# Patient Record
Sex: FEMALE | Race: WHITE
[De-identification: names, ages, dates, MRNs, and addresses within clinical notes are randomized per-mention and may not be internally consistent; named-entity substitution may affect disease eponyms.]

---

## 2021-03-19 ENCOUNTER — HOSPITAL ENCOUNTER (EMERGENCY)
Dept: HOSPITAL 75 - ER FS | Age: 36
Discharge: HOME | End: 2021-03-19
Payer: COMMERCIAL

## 2021-03-19 VITALS — BODY MASS INDEX: 35.59 KG/M2 | HEIGHT: 64.96 IN | WEIGHT: 213.63 LBS

## 2021-03-19 VITALS — SYSTOLIC BLOOD PRESSURE: 132 MMHG | DIASTOLIC BLOOD PRESSURE: 100 MMHG

## 2021-03-19 DIAGNOSIS — G43.909: Primary | ICD-10-CM

## 2021-03-19 DIAGNOSIS — I10: ICD-10-CM

## 2021-03-19 LAB
ALBUMIN SERPL-MCNC: 4.6 GM/DL (ref 3.2–4.5)
ALP SERPL-CCNC: 73 U/L (ref 40–136)
ALT SERPL-CCNC: 53 U/L (ref 0–55)
BASOPHILS # BLD AUTO: 0 10^3/UL (ref 0–0.1)
BASOPHILS NFR BLD AUTO: 0 % (ref 0–10)
BILIRUB SERPL-MCNC: 0.3 MG/DL (ref 0.1–1)
BUN/CREAT SERPL: 22
CALCIUM SERPL-MCNC: 9 MG/DL (ref 8.5–10.1)
CHLORIDE SERPL-SCNC: 99 MMOL/L (ref 98–107)
CO2 SERPL-SCNC: 25 MMOL/L (ref 21–32)
CREAT SERPL-MCNC: 0.85 MG/DL (ref 0.6–1.3)
EOSINOPHIL # BLD AUTO: 0 10^3/UL (ref 0–0.3)
EOSINOPHIL NFR BLD AUTO: 1 % (ref 0–10)
GFR SERPLBLD BASED ON 1.73 SQ M-ARVRAT: > 60 ML/MIN
GLUCOSE SERPL-MCNC: 112 MG/DL (ref 70–105)
HCT VFR BLD CALC: 42 % (ref 35–52)
HGB BLD-MCNC: 14.5 G/DL (ref 11.5–16)
LYMPHOCYTES # BLD AUTO: 1.5 X 10^3 (ref 1–4)
LYMPHOCYTES NFR BLD AUTO: 23 % (ref 12–44)
MAGNESIUM SERPL-MCNC: 1.6 MG/DL (ref 1.6–2.4)
MANUAL DIFFERENTIAL PERFORMED BLD QL: NO
MCH RBC QN AUTO: 30 PG (ref 25–34)
MCHC RBC AUTO-ENTMCNC: 35 G/DL (ref 32–36)
MCV RBC AUTO: 85 FL (ref 80–99)
MONOCYTES # BLD AUTO: 0.5 X 10^3 (ref 0–1)
MONOCYTES NFR BLD AUTO: 7 % (ref 0–12)
NEUTROPHILS # BLD AUTO: 4.5 X 10^3 (ref 1.8–7.8)
NEUTROPHILS NFR BLD AUTO: 68 % (ref 42–75)
PLATELET # BLD: 251 10^3/UL (ref 130–400)
PMV BLD AUTO: 10.5 FL (ref 7.4–10.4)
POTASSIUM SERPL-SCNC: 3.5 MMOL/L (ref 3.6–5)
PROT SERPL-MCNC: 7.2 GM/DL (ref 6.4–8.2)
SODIUM SERPL-SCNC: 137 MMOL/L (ref 135–145)
WBC # BLD AUTO: 6.5 10^3/UL (ref 4.3–11)

## 2021-03-19 PROCEDURE — 85025 COMPLETE CBC W/AUTO DIFF WBC: CPT

## 2021-03-19 PROCEDURE — 86141 C-REACTIVE PROTEIN HS: CPT

## 2021-03-19 PROCEDURE — 36415 COLL VENOUS BLD VENIPUNCTURE: CPT

## 2021-03-19 PROCEDURE — 80053 COMPREHEN METABOLIC PANEL: CPT

## 2021-03-19 PROCEDURE — 83735 ASSAY OF MAGNESIUM: CPT

## 2021-03-19 PROCEDURE — 84484 ASSAY OF TROPONIN QUANT: CPT

## 2021-03-19 PROCEDURE — 93041 RHYTHM ECG TRACING: CPT

## 2021-03-19 NOTE — ED GENERAL
General


Chief Complaint:  Neurological Problems


Stated Complaint:  HIGH BP





History of Present Illness


Date Seen by Provider:  Mar 19, 2021


Time Seen by Provider:  11:15


Initial Comments


35-year-old female presents with some "tongue thickening, some numbness on her 

left arm and side of her face.  Patient reports that she was "presenting her at 

the hospital when it happened.  She has a history of migraines.  She has had a 

couple similar episodes in the past and had an MRI within the last couple months

that was negative.  Patient was concerned with a blood pressure up in the 

systolics of the 150s.  Patient denies any recent illness, no nausea vomiting 

fevers or chills.  Patient does not have any focal weakness or deficits.  

Patient without chest pain, shortness of breath..





Allergies and Home Medications


Allergies


Coded Allergies:  


     No Known Drug Allergies (Unverified , 3/19/21)





Patient Home Medication List


Home Medication List Reviewed:  Yes





Review of Systems


Review of Systems


Constitutional:  No chills, No fever, No weakness


EENTM:  see HPI


Respiratory:  no symptoms reported


Gastrointestinal:  no symptoms reported


Musculoskeletal:  no symptoms reported


Skin:  no symptoms reported


Psychiatric/Neurological:  See HPI


Hematologic/Lymphatic:  No Symptoms Reported


Immunological/Allergic:  no symptoms reported





Past Medical-Social-Family Hx


Past Med/Social Hx:  Reviewed Nursing Past Med/Soc Hx





Physical Exam


Vital Signs





Vital Signs - First Documented








 3/19/21





 11:08


 


Temp 36.6


 


Pulse 99


 


Resp 19


 


B/P (MAP) 147/102 (117)


 


Pulse Ox 97


 


O2 Delivery Room Air





Capillary Refill :


Height, Weight, BMI


Height: '"


Weight: lbs. oz. kg;  BMI


Method:


General Appearance:  No Apparent Distress, WD/WN


Eyes:  Bilateral Eye Normal Inspection, Bilateral Eye PERRL, Bilateral Eye EOMI


HEENT:  PERRL/EOMI, Moist Mucous Membranes


Respiratory:  Lungs Clear, Normal Breath Sounds


Cardiovascular:  Regular Rate, Rhythm, No Edema


Back:  Normal Inspection, No CVA Tenderness


Extremity:  Normal Capillary Refill, Normal Inspection


Neurologic/Psychiatric:  Alert, Oriented x3, No Motor/Sensory Deficits, Normal 

Mood/Affect, CNs II-XII Norm as Tested


Skin:  Normal Color, Warm/Dry





Progress/Results/Core Measures


Suspected Sepsis


SIRS


Temperature: 


Pulse:  


Respiratory Rate: 


 


Laboratory Tests


3/19/21 11:22: White Blood Count 6.5


Blood Pressure  / 


Mean: 


 











Laboratory Tests


3/19/21 11:22: 


Creatinine 0.85, Platelet Count 251, Total Bilirubin 0.3





Results/Orders


Lab Results





Laboratory Tests








Test


 3/19/21


11:22 Range/Units


 


 


White Blood Count


 6.5 


 4.3-11.0


10^3/uL


 


Red Blood Count


 4.91 


 4.35-5.85


10^6/uL


 


Hemoglobin 14.5  11.5-16.0  G/DL


 


Hematocrit 42  35-52  %


 


Mean Corpuscular Volume 85  80-99  FL


 


Mean Corpuscular Hemoglobin 30  25-34  PG


 


Mean Corpuscular Hemoglobin


Concent 35 


 32-36  G/DL





 


Red Cell Distribution Width 12.5  10.0-14.5  %


 


Platelet Count


 251 


 130-400


10^3/uL


 


Mean Platelet Volume 10.5 H 7.4-10.4  FL


 


Immature Granulocyte % (Auto) 0   %


 


Neutrophils (%) (Auto) 68  42-75  %


 


Lymphocytes (%) (Auto) 23  12-44  %


 


Monocytes (%) (Auto) 7  0-12  %


 


Eosinophils (%) (Auto) 1  0-10  %


 


Basophils (%) (Auto) 0  0-10  %


 


Neutrophils # (Auto) 4.5  1.8-7.8  X 10^3


 


Lymphocytes # (Auto) 1.5  1.0-4.0  X 10^3


 


Monocytes # (Auto) 0.5  0.0-1.0  X 10^3


 


Eosinophils # (Auto)


 0.0 


 0.0-0.3


10^3/uL


 


Basophils # (Auto)


 0.0 


 0.0-0.1


10^3/uL


 


Immature Granulocyte # (Auto)


 0.0 


 0.0-0.1


10^3/uL


 


Sodium Level 137  135-145  MMOL/L


 


Potassium Level 3.5 L 3.6-5.0  MMOL/L


 


Chloride Level 99    MMOL/L


 


Carbon Dioxide Level 25  21-32  MMOL/L


 


Anion Gap 13  5-14  MMOL/L


 


Blood Urea Nitrogen 19 H 7-18  MG/DL


 


Creatinine


 0.85 


 0.60-1.30


MG/DL


 


Estimat Glomerular Filtration


Rate > 60 


  





 


BUN/Creatinine Ratio 22   


 


Glucose Level 112 H   MG/DL


 


Calcium Level 9.0  8.5-10.1  MG/DL


 


Corrected Calcium   8.5-10.1  MG/DL


 


Magnesium Level 1.6  1.6-2.4  MG/DL


 


Total Bilirubin 0.3  0.1-1.0  MG/DL


 


Aspartate Amino Transf


(AST/SGOT) 32 


 5-34  U/L





 


Alanine Aminotransferase


(ALT/SGPT) 53 


 0-55  U/L





 


Alkaline Phosphatase 73    U/L


 


Troponin I < 0.30  <0.30  NG/ML


 


C-Reactive Protein 0.29  <0.50  MG/DL


 


Total Protein 7.2  6.4-8.2  GM/DL


 


Albumin 4.6 H 3.2-4.5  GM/DL








My Orders





Orders - CLAUDE VELASQUEZ DO


Cbc With Automated Diff (3/19/21 11:15)


Comprehensive Metabolic Panel (3/19/21 11:15)


Magnesium (3/19/21 11:15)


Crp Fs (3/19/21 11:15)


Troponin I Fs (3/19/21 11:15)


Ekg Tracing (3/19/21 11:16)


Monitor-Rhythm Ecg Trace Only (3/19/21 11:16)


Ketorolac Injection (Toradol Injection) (3/19/21 11:16)


Diphenhydramine Injection (Benadryl Inje (3/19/21 11:16)





Vital Signs/I&O











 3/19/21





 11:08


 


Temp 36.6


 


Pulse 99


 


Resp 19


 


B/P (MAP) 147/102 (117)


 


Pulse Ox 97


 


O2 Delivery Room Air





Capillary Refill :


Progress Note :  


Progress Note


Patient symptoms resolved with treatment for migraine.  Patient was given 

Toradol and Benadryl.  Her symptoms are very consistent with an atypical 

migraine with aura.  Patient was [facility] may in the past due to her chronic 

headaches.  Recommend she follow-up with her primary care provider for recheck 

of symptom





Departure


Impression





   Primary Impression:  


   Migraine


   Qualified Codes:  G43.109 - Migraine with aura, not intractable, without 

   status migrainosus


Disposition:  01 HOME, SELF-CARE


Condition:  Stable





Departure-Patient Inst.


Referrals:  


[facility] FARNSWORTH (PCP)


Primary Care Physician








[facility] (Family)


Primary Care Physician


Patient Instructions:  Migraines in Adults, How to Keep Track of Your Headaches





Add. Discharge Instructions:  


Follow-up with your primary care provider next week for recheck of today's 

complaints and continuation of care





All discharge instructions reviewed with patient and/or family. Voiced 

understanding.











CLAUDE VELASQUEZ DO               Mar 19, 2021 11:15